# Patient Record
Sex: FEMALE | Race: WHITE | NOT HISPANIC OR LATINO | Employment: OTHER | URBAN - METROPOLITAN AREA
[De-identification: names, ages, dates, MRNs, and addresses within clinical notes are randomized per-mention and may not be internally consistent; named-entity substitution may affect disease eponyms.]

---

## 2021-12-14 ENCOUNTER — ESTABLISHED PATIENT (OUTPATIENT)
Dept: URBAN - METROPOLITAN AREA CLINIC 7 | Facility: CLINIC | Age: 26
End: 2021-12-14

## 2021-12-14 DIAGNOSIS — H52.13: ICD-10-CM

## 2021-12-14 PROCEDURE — 92014 COMPRE OPH EXAM EST PT 1/>: CPT

## 2021-12-14 PROCEDURE — 92015 DETERMINE REFRACTIVE STATE: CPT

## 2021-12-14 PROCEDURE — 92310C CONTACT LENS 75

## 2021-12-14 ASSESSMENT — VISUAL ACUITY
OU_SC: 20/200
OU_CC: 20/20
OS_CC: 20/20
OS_SC: 20/200
OD_SC: 20/200
OD_CC: 20/20

## 2021-12-14 ASSESSMENT — KERATOMETRY
OD_AXISANGLE_DEGREES: 114
OS_AXISANGLE_DEGREES: 003
OD_K2POWER_DIOPTERS: 42.25
OS_K2POWER_DIOPTERS: 42.75
OS_AXISANGLE2_DEGREES: 93
OS_K1POWER_DIOPTERS: 41.48
OD_AXISANGLE2_DEGREES: 24
OD_K1POWER_DIOPTERS: 41.25

## 2022-06-30 RX ORDER — ESCITALOPRAM OXALATE 5 MG/1
TABLET ORAL
COMMUNITY

## 2022-06-30 RX ORDER — ESCITALOPRAM OXALATE 10 MG/1
TABLET ORAL
COMMUNITY

## 2022-12-01 NOTE — PATIENT DISCUSSION
12/1/2022:  VA holding better than retina would suggest.  DRY on OCT today monitor going forward with serial analysis.

## 2023-01-20 ENCOUNTER — ESTABLISHED PATIENT (OUTPATIENT)
Dept: URBAN - METROPOLITAN AREA CLINIC 10 | Facility: CLINIC | Age: 28
End: 2023-01-20

## 2023-01-20 DIAGNOSIS — H52.13: ICD-10-CM

## 2023-01-20 PROCEDURE — 92014 COMPRE OPH EXAM EST PT 1/>: CPT

## 2023-01-20 PROCEDURE — 92310C CONTACT LENS 75

## 2023-01-20 PROCEDURE — 92015 DETERMINE REFRACTIVE STATE: CPT

## 2023-01-20 ASSESSMENT — KERATOMETRY
OS_K1POWER_DIOPTERS: 41.48
OD_AXISANGLE2_DEGREES: 24
OD_AXISANGLE_DEGREES: 5
OS_K1POWER_DIOPTERS: 41.75
OS_AXISANGLE2_DEGREES: 93
OS_AXISANGLE_DEGREES: 5
OD_K1POWER_DIOPTERS: 41.25
OS_AXISANGLE2_DEGREES: 95
OS_K2POWER_DIOPTERS: 42.50
OD_K2POWER_DIOPTERS: 42.50
OD_AXISANGLE_DEGREES: 114
OS_K2POWER_DIOPTERS: 42.75
OD_K1POWER_DIOPTERS: 41.75
OD_AXISANGLE2_DEGREES: 95
OD_K2POWER_DIOPTERS: 42.25
OS_AXISANGLE_DEGREES: 003

## 2023-01-20 ASSESSMENT — VISUAL ACUITY
OD_CC: 20/40
OS_CC: 20/20
OU_CC: 20/20

## 2023-04-24 ENCOUNTER — EMERGENCY VISIT (OUTPATIENT)
Dept: URBAN - METROPOLITAN AREA CLINIC 10 | Facility: CLINIC | Age: 28
End: 2023-04-24

## 2023-04-24 DIAGNOSIS — H10.12: ICD-10-CM

## 2023-04-24 PROCEDURE — 99213 OFFICE O/P EST LOW 20 MIN: CPT

## 2023-04-24 ASSESSMENT — KERATOMETRY
OS_AXISANGLE2_DEGREES: 95
OD_K1POWER_DIOPTERS: 41.25
OS_AXISANGLE2_DEGREES: 93
OD_K1POWER_DIOPTERS: 41.75
OS_AXISANGLE_DEGREES: 003
OD_AXISANGLE_DEGREES: 114
OD_AXISANGLE_DEGREES: 5
OD_AXISANGLE2_DEGREES: 24
OS_K2POWER_DIOPTERS: 42.50
OD_K2POWER_DIOPTERS: 42.25
OS_K1POWER_DIOPTERS: 41.75
OS_K2POWER_DIOPTERS: 42.75
OS_AXISANGLE_DEGREES: 5
OD_K2POWER_DIOPTERS: 42.50
OD_AXISANGLE2_DEGREES: 95
OS_K1POWER_DIOPTERS: 41.48

## 2023-04-24 ASSESSMENT — VISUAL ACUITY
OD_CC: 20/20-1
OU_CC: 20/20-1
OS_CC: 20/20

## 2024-01-22 ENCOUNTER — ESTABLISHED PATIENT (OUTPATIENT)
Dept: URBAN - METROPOLITAN AREA CLINIC 10 | Facility: CLINIC | Age: 29
End: 2024-01-22

## 2024-01-22 DIAGNOSIS — H52.13: ICD-10-CM

## 2024-01-22 PROCEDURE — 92015 DETERMINE REFRACTIVE STATE: CPT

## 2024-01-22 PROCEDURE — 92014 COMPRE OPH EXAM EST PT 1/>: CPT

## 2024-01-22 PROCEDURE — 92310C CONTACT LENS 75

## 2024-01-22 ASSESSMENT — VISUAL ACUITY
OU_CC: 20/20
OS_CC: 20/20
OD_CC: 20/30-2

## 2024-01-22 ASSESSMENT — KERATOMETRY
OS_AXISANGLE2_DEGREES: 99
OS_K2POWER_DIOPTERS: 42.50
OD_AXISANGLE2_DEGREES: 89
OS_AXISANGLE_DEGREES: 9
OD_K1POWER_DIOPTERS: 41.25
OD_K2POWER_DIOPTERS: 42.75
OD_AXISANGLE_DEGREES: 179
OS_K1POWER_DIOPTERS: 41.75

## 2024-01-22 ASSESSMENT — TONOMETRY
OS_IOP_MMHG: 21
OD_IOP_MMHG: 20

## 2025-06-02 ENCOUNTER — COMPREHENSIVE EXAM (OUTPATIENT)
Age: 30
End: 2025-06-02

## 2025-06-02 DIAGNOSIS — H52.13: ICD-10-CM

## 2025-06-02 PROCEDURE — 92014 COMPRE OPH EXAM EST PT 1/>: CPT

## 2025-06-02 PROCEDURE — 92015 DETERMINE REFRACTIVE STATE: CPT

## 2025-06-02 PROCEDURE — 92310-2 LEVEL 2 SOFT LENS UPDATE
